# Patient Record
Sex: MALE | Race: BLACK OR AFRICAN AMERICAN | NOT HISPANIC OR LATINO | URBAN - METROPOLITAN AREA
[De-identification: names, ages, dates, MRNs, and addresses within clinical notes are randomized per-mention and may not be internally consistent; named-entity substitution may affect disease eponyms.]

---

## 2024-11-12 ENCOUNTER — OFFICE VISIT (OUTPATIENT)
Dept: URGENT CARE | Age: 29
End: 2024-11-12
Payer: COMMERCIAL

## 2024-11-12 VITALS
OXYGEN SATURATION: 97 % | HEIGHT: 70 IN | HEART RATE: 118 BPM | WEIGHT: 178 LBS | TEMPERATURE: 98 F | SYSTOLIC BLOOD PRESSURE: 146 MMHG | DIASTOLIC BLOOD PRESSURE: 98 MMHG | BODY MASS INDEX: 25.48 KG/M2 | RESPIRATION RATE: 19 BRPM

## 2024-11-12 DIAGNOSIS — H65.03 NON-RECURRENT ACUTE SEROUS OTITIS MEDIA OF BOTH EARS: ICD-10-CM

## 2024-11-12 DIAGNOSIS — J01.00 ACUTE NON-RECURRENT MAXILLARY SINUSITIS: ICD-10-CM

## 2024-11-12 DIAGNOSIS — J20.9 ACUTE BRONCHITIS, UNSPECIFIED ORGANISM: Primary | ICD-10-CM

## 2024-11-12 RX ORDER — PREDNISONE 20 MG/1
20 TABLET ORAL 2 TIMES DAILY
Qty: 10 TABLET | Refills: 0 | Status: SHIPPED | OUTPATIENT
Start: 2024-11-12 | End: 2024-11-17

## 2024-11-12 RX ORDER — BROMPHENIRAMINE MALEATE, PSEUDOEPHEDRINE HYDROCHLORIDE, AND DEXTROMETHORPHAN HYDROBROMIDE 2; 30; 10 MG/5ML; MG/5ML; MG/5ML
10 SYRUP ORAL 4 TIMES DAILY PRN
Qty: 240 ML | Refills: 0 | Status: SHIPPED | OUTPATIENT
Start: 2024-11-12

## 2024-11-12 RX ORDER — AZITHROMYCIN 250 MG/1
TABLET, FILM COATED ORAL
Qty: 6 TABLET | Refills: 0 | Status: SHIPPED | OUTPATIENT
Start: 2024-11-12

## 2024-11-12 NOTE — PROGRESS NOTES
"Subjective   Patient ID: Albert Gonzales is a 29 y.o. male. They present today with a chief complaint of Illness (Patient stated for about 2 weeks he has been having a chest cold, it is hard for him to breath. Possible bronchitis.).    History of Present Illness  Subjective  Albert Gonzales is a 29 y.o. male who presents for evaluation of symptoms of a URI. Symptoms include congestion, cough described as productive, nasal congestion, and shortness of breath. Onset of symptoms was 1 week ago and has been gradually worsening since that time. He denies fevers. No shortness of breath is reported.        Illness      Past Medical History  Allergies as of 11/12/2024    (No Known Allergies)       (Not in a hospital admission)       History reviewed. No pertinent past medical history.    Past Surgical History:   Procedure Laterality Date    OTHER SURGICAL HISTORY  07/05/2017    Biopsy Skin        reports that he has quit smoking. His smoking use included cigarettes. He does not have any smokeless tobacco history on file.    Review of Systems  Review of Systems                               Objective    Vitals:    11/12/24 1334   BP: (!) 146/98   Pulse: (!) 118   Resp: 19   Temp: 36.7 °C (98 °F)   TempSrc: Oral   SpO2: 97%   Weight: 80.7 kg (178 lb)   Height: 1.778 m (5' 10\")     No LMP for male patient.    Physical Exam  Constitutional:       General: He is not in acute distress.     Appearance: Normal appearance.   HENT:      Right Ear: Ear canal and external ear normal. A middle ear effusion is present.      Left Ear: Ear canal and external ear normal. A middle ear effusion is present.      Nose: Congestion and rhinorrhea present.      Mouth/Throat:      Mouth: Mucous membranes are moist.   Eyes:      Extraocular Movements: Extraocular movements intact.      Pupils: Pupils are equal, round, and reactive to light.   Cardiovascular:      Rate and Rhythm: Normal rate and regular rhythm.      Pulses: Normal " pulses.      Heart sounds: Normal heart sounds.   Pulmonary:      Effort: Pulmonary effort is normal.      Breath sounds: Rhonchi present. No wheezing or rales.   Musculoskeletal:      Cervical back: Normal range of motion. No rigidity or tenderness.   Lymphadenopathy:      Cervical: No cervical adenopathy.   Neurological:      Mental Status: He is alert.         Procedures    Point of Care Test & Imaging Results from this visit  No results found for this visit on 11/12/24.   No results found.    Diagnostic study results (if any) were reviewed by Graciela Bauer MD.    Assessment/Plan   Allergies, medications, history, and pertinent labs/EKGs/Imaging reviewed by Graciela Bauer MD.       Orders and Diagnoses  There are no diagnoses linked to this encounter.    Medical Admin Record      Patient disposition: Home    Electronically signed by Graciela Bauer MD  1:43 PM

## 2024-11-12 NOTE — LETTER
November 12, 2024     Patient: Albert Gonzales   YOB: 1995   Date of Visit: 11/12/2024       To Whom It May Concern:    Albert Gonzales was seen in my clinic on 11/12/2024 at 1:20 pm. Please excuse Albert for his absence from work on 11/12/24.    If you have any questions or concerns, please don't hesitate to call.         Sincerely,         Graciela Bauer MD        CC: No Recipients

## 2024-11-12 NOTE — PATIENT INSTRUCTIONS
Antibiotics, Prednisone as directed; take with food  Bromfed as needed  Follow up with new or worsening symptoms